# Patient Record
Sex: FEMALE | Race: WHITE | Employment: UNEMPLOYED | URBAN - METROPOLITAN AREA
[De-identification: names, ages, dates, MRNs, and addresses within clinical notes are randomized per-mention and may not be internally consistent; named-entity substitution may affect disease eponyms.]

---
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
## 2023-01-03 NOTE — PROGRESS NOTES
Severe fecal stasis noted. Would recommend patient try a clean out with Miralax asap. Attempted to call number but no answer, VM left.

## 2023-01-13 ENCOUNTER — TELEPHONE (OUTPATIENT)
Dept: PEDIATRICS CLINIC | Age: 18
End: 2023-01-13

## 2024-03-08 ENCOUNTER — OFFICE VISIT (OUTPATIENT)
Facility: CLINIC | Age: 19
End: 2024-03-08

## 2024-03-08 VITALS
BODY MASS INDEX: 34.82 KG/M2 | HEIGHT: 70 IN | WEIGHT: 243.2 LBS | SYSTOLIC BLOOD PRESSURE: 106 MMHG | TEMPERATURE: 99.1 F | DIASTOLIC BLOOD PRESSURE: 74 MMHG

## 2024-03-08 DIAGNOSIS — Z00.00 ROUTINE GENERAL MEDICAL EXAMINATION AT A HEALTH CARE FACILITY: Primary | ICD-10-CM

## 2024-03-08 DIAGNOSIS — Z13.31 SCREENING FOR DEPRESSION: ICD-10-CM

## 2024-03-08 ASSESSMENT — PATIENT HEALTH QUESTIONNAIRE - PHQ9
SUM OF ALL RESPONSES TO PHQ QUESTIONS 1-9: 1
1. LITTLE INTEREST OR PLEASURE IN DOING THINGS: 1
SUM OF ALL RESPONSES TO PHQ QUESTIONS 1-9: 1
SUM OF ALL RESPONSES TO PHQ QUESTIONS 1-9: 1
2. FEELING DOWN, DEPRESSED OR HOPELESS: 0
SUM OF ALL RESPONSES TO PHQ QUESTIONS 1-9: 1
SUM OF ALL RESPONSES TO PHQ9 QUESTIONS 1 & 2: 1

## 2024-03-08 NOTE — PROGRESS NOTES
History  Chad Fuentes is a 18 y.o. female presenting for well adolescent and/or school/sports physical. She is seen today alone.    Parental concerns: she has been doing well  Follow up on previous concerns:  no skin problems    Headaches on and off  Drinking water, Excedrin      She has a history of anxiety, has not been to counseling in a long time  She complains about difficulty focusing        Regularity:  irregular, 2-3 will miss and then a good streak  Menstrual problems:    GYN for irregular cycle several months ago, PCOS, labs done  Low dose birth control recommended, she did not start because concern about side effects      Social/Family History  Changes since last visit:  none  Teen lives with father, mother, and sister  Relationship with parents/siblings:  normal    Risk Assessment    Education:   College -[employer]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  Eating:   Eats regular meals including adequate fruits and vegetables:  Yes-at least 2 meals, snacks -not a breakfast eater   Drinks non-sweetened liquids:  no more energy drinks past 6 months; water, soda every now and then, coffee but not as much   Calcium source:  cheese, greek yogurt, Vitamin A, probiotic , multivitamin   Has concerns about body or appearance:  Yes  Activities:   Has friends:  Yes   At least 1 hour of physical activity/day:  Yes-5 days   Screen time (except for homework) less than 2 hrs/day:  No  Drugs (Substance use/abuse):   Uses tobacco/alcohol/drugs:  no  Safety:   Home is free of violence:  Yes   Uses safety belts/safety equipment:  Yes   Has relationships free of violence:  Yes   Impaired/Distracted driving:  No  Sex:   Sexually active?:  No   In relationship but is practicing abstinence     Suicidality/Mental Health:   Has ways to cope with stress:  Yes   Displays self-confidence:  Yes   Has problems with sleep:  10-11 pm to 5-6 hours; Melatonin sometimes   Gets depressed, anxious, or irritable/has mood swings:    No   Has

## 2025-08-15 ENCOUNTER — TRANSCRIBE ORDERS (OUTPATIENT)
Facility: HOSPITAL | Age: 20
End: 2025-08-15

## 2025-08-15 DIAGNOSIS — M25.312 INSTABILITY OF LEFT SHOULDER JOINT: Primary | ICD-10-CM

## 2025-08-19 ENCOUNTER — TRANSCRIBE ORDERS (OUTPATIENT)
Facility: HOSPITAL | Age: 20
End: 2025-08-19

## 2025-08-19 DIAGNOSIS — M25.312 INSTABILITY OF LEFT SHOULDER JOINT: Primary | ICD-10-CM

## 2025-08-26 ENCOUNTER — TRANSCRIBE ORDERS (OUTPATIENT)
Facility: HOSPITAL | Age: 20
End: 2025-08-26

## 2025-08-26 DIAGNOSIS — M25.512 ACUTE PAIN OF LEFT SHOULDER: ICD-10-CM

## 2025-08-26 DIAGNOSIS — M25.312 INSTABILITY OF LEFT SHOULDER JOINT: Primary | ICD-10-CM
